# Patient Record
Sex: FEMALE | Race: WHITE | Employment: UNEMPLOYED | URBAN - METROPOLITAN AREA
[De-identification: names, ages, dates, MRNs, and addresses within clinical notes are randomized per-mention and may not be internally consistent; named-entity substitution may affect disease eponyms.]

---

## 2018-06-04 ENCOUNTER — OFFICE VISIT (OUTPATIENT)
Dept: OBGYN CLINIC | Facility: CLINIC | Age: 53
End: 2018-06-04
Payer: COMMERCIAL

## 2018-06-04 ENCOUNTER — APPOINTMENT (OUTPATIENT)
Dept: RADIOLOGY | Facility: CLINIC | Age: 53
End: 2018-06-04
Payer: COMMERCIAL

## 2018-06-04 VITALS
HEART RATE: 125 BPM | BODY MASS INDEX: 23 KG/M2 | DIASTOLIC BLOOD PRESSURE: 81 MMHG | SYSTOLIC BLOOD PRESSURE: 163 MMHG | HEIGHT: 62 IN | WEIGHT: 125 LBS

## 2018-06-04 DIAGNOSIS — M25.532 PAIN IN LEFT WRIST: ICD-10-CM

## 2018-06-04 DIAGNOSIS — S52.592A OTHER CLOSED FRACTURE OF DISTAL END OF LEFT RADIUS, INITIAL ENCOUNTER: Primary | ICD-10-CM

## 2018-06-04 PROCEDURE — 25600 CLTX DST RDL FX/EPHYS SEP WO: CPT | Performed by: ORTHOPAEDIC SURGERY

## 2018-06-04 PROCEDURE — 99203 OFFICE O/P NEW LOW 30 MIN: CPT | Performed by: ORTHOPAEDIC SURGERY

## 2018-06-04 PROCEDURE — 73110 X-RAY EXAM OF WRIST: CPT

## 2018-06-04 NOTE — PROGRESS NOTES
Assessment/Plan:  1  Other closed fracture of distal end of left radius, initial encounter  XR wrist 3+ vw left       Scribe Attestation    I,:   Mila Garza am acting as a scribe while in the presence of the attending physician :        I,:   Miguel Elliott, DO personally performed the services described in this documentation    as scribed in my presence :            Husam Keyes has left sided wrist pain consistent with a nondisplaced distal radius fracture  She was placed in a exos-cast at today's appointment  She was instructed to stay in the cast at all times  She can continue with the OTC anti-inflammatories as needed  We discussed that if all goes as planned she will be in the cast for 4 weeks total  She will follow up in 1 week for repeat x-ray  She can come out of the exos for the x-ray in 1 week  Subjective:   Kaye Light is a 46 y o  female who presents to the office today for left sided wrist pain after falling this morning on an outstretched hand  She states she has a friend who is an orthopedic PA and she placed her in a splint and told her to see an orthopedic  She took Motrin this morning which is helping  She states her pain increases with movement and decreases at rest when in the splint  She denies any radiating pain or numbness at today's appointment  Review of Systems   Constitutional: Negative for chills, fever and unexpected weight change  HENT: Negative for hearing loss, nosebleeds and sore throat  Eyes: Negative for pain, redness and visual disturbance  Respiratory: Negative for cough, shortness of breath and wheezing  Cardiovascular: Negative for chest pain, palpitations and leg swelling  Gastrointestinal: Negative for abdominal pain, nausea and vomiting  Endocrine: Negative for polydipsia and polyuria  Genitourinary: Negative for dysuria and hematuria  Musculoskeletal: Positive for arthralgias and myalgias  See HPI   Skin: Negative for rash and wound  Neurological: Negative for dizziness, numbness and headaches  Psychiatric/Behavioral: Negative for decreased concentration and suicidal ideas  The patient is not nervous/anxious  History reviewed  No pertinent past medical history  History reviewed  No pertinent surgical history  History reviewed  No pertinent family history  Social History     Occupational History    Not on file  Social History Main Topics    Smoking status: Never Smoker    Smokeless tobacco: Never Used    Alcohol use Not on file    Drug use: Unknown    Sexual activity: Not on file       No current outpatient prescriptions on file  Allergies   Allergen Reactions    Penicillins        Objective:  Vitals:    06/04/18 1404   BP: 163/81   Pulse: (!) 125       Right Hand Exam   Right hand exam is normal       Left Hand Exam     Tenderness   The patient is experiencing tenderness in the radial area  Range of Motion     Wrist   Extension:  15 abnormal   Flexion:  20 abnormal     Other   Sensation: normal            Physical Exam   Constitutional: She is oriented to person, place, and time  She appears well-developed and well-nourished  HENT:   Head: Normocephalic and atraumatic  Eyes: Conjunctivae are normal    Neck: Neck supple  Cardiovascular: Intact distal pulses  Pulmonary/Chest: Effort normal    Neurological: She is alert and oriented to person, place, and time  Skin: Skin is warm and dry  Psychiatric: She has a normal mood and affect  Her behavior is normal    Vitals reviewed  I have personally reviewed pertinent films in PACS and my interpretation is as follows: Three view right wrist x-ray taken on 6/4/2018 shows a nondisplaced distal radius fracture

## 2018-06-11 ENCOUNTER — OFFICE VISIT (OUTPATIENT)
Dept: OBGYN CLINIC | Facility: CLINIC | Age: 53
End: 2018-06-11

## 2018-06-11 ENCOUNTER — APPOINTMENT (OUTPATIENT)
Dept: RADIOLOGY | Facility: CLINIC | Age: 53
End: 2018-06-11
Payer: COMMERCIAL

## 2018-06-11 VITALS
SYSTOLIC BLOOD PRESSURE: 156 MMHG | DIASTOLIC BLOOD PRESSURE: 79 MMHG | BODY MASS INDEX: 23.59 KG/M2 | HEART RATE: 111 BPM | HEIGHT: 62 IN | WEIGHT: 128.2 LBS

## 2018-06-11 DIAGNOSIS — S52.592A OTHER CLOSED FRACTURE OF DISTAL END OF LEFT RADIUS, INITIAL ENCOUNTER: ICD-10-CM

## 2018-06-11 DIAGNOSIS — S52.592A OTHER CLOSED FRACTURE OF DISTAL END OF LEFT RADIUS, INITIAL ENCOUNTER: Primary | ICD-10-CM

## 2018-06-11 PROCEDURE — 73100 X-RAY EXAM OF WRIST: CPT

## 2018-06-11 PROCEDURE — 99024 POSTOP FOLLOW-UP VISIT: CPT | Performed by: ORTHOPAEDIC SURGERY

## 2018-06-11 NOTE — PROGRESS NOTES
Assessment/Plan:  1  Other closed fracture of distal end of left radius, initial encounter  CANCELED: XR wrist 3+ vw left       Scribe Attestation    I,:   Mila Garza am acting as a scribe while in the presence of the attending physician :        I,:   Chris Wiseman, DO personally performed the services described in this documentation    as scribed in my presence :          Edin is improving with her nondisplaced distal radius fracture  We discussed that if she would like to exercise she should cross train and not run at this time  She should remain in her exos-cast until further instructed  She will follow up in 3 weeks for repeat evaluation and x-ray  Subjective:   Steve Finnegan is a 46 y o  female who presents to the office for follow up distal radius fracture  She was last seen 1 week ago after suffering a fall on an outstretched hand  She was placed in an exos-cast at her last appointment  At today's appointment she states she is feeling better  She has been wearing the exos-cast as directed  She did try to run last week and felt a cracking and some pain in her wrist so she stopped running  She started to experience some numbness into her thumb which has lessened since she ran  She denies any new injury or trauma at today's appointment  Review of Systems   Constitutional: Negative for chills, fever and unexpected weight change  HENT: Negative for hearing loss, nosebleeds and sore throat  Eyes: Negative for pain, redness and visual disturbance  Respiratory: Negative for cough, shortness of breath and wheezing  Cardiovascular: Negative for chest pain, palpitations and leg swelling  Gastrointestinal: Negative for abdominal pain, nausea and vomiting  Endocrine: Negative for polydipsia and polyuria  Genitourinary: Negative for dysuria and hematuria  Musculoskeletal:        See HPI   Skin: Negative for rash and wound  Neurological: Negative for dizziness, numbness and headaches  Psychiatric/Behavioral: Negative for decreased concentration and suicidal ideas  The patient is not nervous/anxious  Past Medical History:   Diagnosis Date    Fractures        Past Surgical History:   Procedure Laterality Date     SECTION         Family History   Problem Relation Age of Onset    No Known Problems Mother     No Known Problems Father        Social History     Occupational History    Not on file  Social History Main Topics    Smoking status: Never Smoker    Smokeless tobacco: Never Used    Alcohol use Not on file    Drug use: Unknown    Sexual activity: Not on file       No current outpatient prescriptions on file  Allergies   Allergen Reactions    Penicillins        Objective:  Vitals:    18 1303   BP: 156/79   Pulse: (!) 111       Right Hand Exam   Right hand exam is normal       Left Hand Exam     Tenderness   The patient is experiencing tenderness in the radial area  Other   Sensation: normal    Comments:  Bruising over mid-distal inside forearm             Physical Exam   Constitutional: She is oriented to person, place, and time  She appears well-developed and well-nourished  HENT:   Head: Normocephalic and atraumatic  Eyes: Conjunctivae are normal    Neck: Neck supple  Cardiovascular: Intact distal pulses  Pulmonary/Chest: Effort normal    Neurological: She is alert and oriented to person, place, and time  Skin: Skin is warm and dry  Psychiatric: She has a normal mood and affect  Her behavior is normal    Vitals reviewed  I have personally reviewed pertinent films in PACS and my interpretation is as follows: Two view left wrist x-ray taken on 18 shows a stable nondisplaced distal radius fracture

## 2018-07-09 ENCOUNTER — OFFICE VISIT (OUTPATIENT)
Dept: OBGYN CLINIC | Facility: CLINIC | Age: 53
End: 2018-07-09

## 2018-07-09 ENCOUNTER — APPOINTMENT (OUTPATIENT)
Dept: RADIOLOGY | Facility: CLINIC | Age: 53
End: 2018-07-09
Payer: COMMERCIAL

## 2018-07-09 VITALS
BODY MASS INDEX: 23.55 KG/M2 | HEIGHT: 62 IN | WEIGHT: 128 LBS | SYSTOLIC BLOOD PRESSURE: 159 MMHG | DIASTOLIC BLOOD PRESSURE: 80 MMHG | HEART RATE: 100 BPM

## 2018-07-09 DIAGNOSIS — S52.592A OTHER CLOSED FRACTURE OF DISTAL END OF LEFT RADIUS, INITIAL ENCOUNTER: ICD-10-CM

## 2018-07-09 DIAGNOSIS — S52.592D OTHER CLOSED FRACTURE OF DISTAL END OF LEFT RADIUS WITH ROUTINE HEALING, SUBSEQUENT ENCOUNTER: Primary | ICD-10-CM

## 2018-07-09 PROCEDURE — 99024 POSTOP FOLLOW-UP VISIT: CPT | Performed by: ORTHOPAEDIC SURGERY

## 2018-07-09 PROCEDURE — 73100 X-RAY EXAM OF WRIST: CPT

## 2018-07-09 NOTE — PROGRESS NOTES
Assessment/Plan:  1  Other closed fracture of distal end of left radius, initial encounter  XR wrist 2 vw left     Rosio Brennan is doing well following her left wrist fracture  Her x-ray shows stable alignment and healing  Her exam demonstrates no tenderness  She has slightly reduced range of motion due to the healing fracture and immobilization  She can begin gently mobilizing her left wrist  She may stop wearing the cast as long as she remains pain free  I advised her to refrain from upper extremity exercises for additional 2 weeks  It she can progress as tolerated following that time  I will see her back as needed  Subjective:   Darrel Farr is a 46 y o  female who presents for follow-up for a left wrist injury  She had a nondisplaced distal radius fracture 4 weeks ago  She has been in a cast for past 4 weeks  She has a removable cast and states that she has been taking off more in the last week and feels perfectly fine  She still wears a while sleeping  She denies any new injury  Past Medical History:   Diagnosis Date    Fractures        Past Surgical History:   Procedure Laterality Date     SECTION         Family History   Problem Relation Age of Onset    No Known Problems Mother     No Known Problems Father        Social History     Occupational History    Not on file  Social History Main Topics    Smoking status: Never Smoker    Smokeless tobacco: Never Used    Alcohol use Not on file    Drug use: Unknown    Sexual activity: Not on file       No current outpatient prescriptions on file  Allergies   Allergen Reactions    Penicillins        Objective:  Vitals:    18 1058   BP: 159/80   Pulse: 100       Left Hand Exam     Tenderness   The patient is experiencing no tenderness  Range of Motion     Wrist   Extension:  40 abnormal   Flexion: normal   Pronation: normal   Supination: normal     Muscle Strength   The patient has normal left wrist strength      Other Erythema: absent  Sensation: normal  Pulse: present          Strength/Myotome Testing     Left Wrist/Hand   Normal wrist strength      Physical Exam   Constitutional: She is oriented to person, place, and time  She appears well-developed and well-nourished  HENT:   Head: Normocephalic and atraumatic  Eyes: Conjunctivae are normal    Neck: Neck supple  Cardiovascular: Intact distal pulses  Pulmonary/Chest: Effort normal    Neurological: She is alert and oriented to person, place, and time  Skin: Skin is warm and dry  Psychiatric: She has a normal mood and affect  Her behavior is normal    Vitals reviewed  Procedures    I personally reviewed PACS images in the office today and my interpretation is as follows: Three-view x-rays of the left wrist demonstrate a stable healing distal radius fracture with callus formation

## 2019-01-21 ENCOUNTER — TELEPHONE (OUTPATIENT)
Dept: OBGYN CLINIC | Facility: CLINIC | Age: 54
End: 2019-01-21

## 2019-01-24 ENCOUNTER — OFFICE VISIT (OUTPATIENT)
Dept: OBGYN CLINIC | Facility: CLINIC | Age: 54
End: 2019-01-24
Payer: COMMERCIAL

## 2019-01-24 VITALS
BODY MASS INDEX: 22.08 KG/M2 | SYSTOLIC BLOOD PRESSURE: 160 MMHG | HEIGHT: 62 IN | DIASTOLIC BLOOD PRESSURE: 98 MMHG | WEIGHT: 120 LBS

## 2019-01-24 DIAGNOSIS — N63.25 BREAST LUMP ON LEFT SIDE AT 3 O'CLOCK POSITION: ICD-10-CM

## 2019-01-24 DIAGNOSIS — Z12.39 BREAST CANCER SCREENING: Primary | ICD-10-CM

## 2019-01-24 PROCEDURE — 99213 OFFICE O/P EST LOW 20 MIN: CPT | Performed by: NURSE PRACTITIONER

## 2019-01-24 NOTE — PROGRESS NOTES
Assessment/Plan:    Breast cancer screening  Rx for mammogram with instructions on how to schedule  Breast lump on left side at 3 o'clock position  Rx for diagnostic mammogram of left breast to assess for left breast lump  Reviewed how to schedule, advised pt to call today and schedule  Will follow up accordingly       Diagnoses and all orders for this visit:    Breast cancer screening  -     Mammo screening bilateral w cad; Future    Breast lump on left side at 3 o'clock position  -     US breast left limited (diagnostic); Future          Subjective:      Patient ID: Christiane Reese is a 48 y o  female  Pt presents with complaints of "felt lump in left breast "   Pt states one week ago she was playing with son and he accidentally bumper her breast  Afterwards she noticed soreness on her left breast  When in the shower she did self breast exam and noticed a rather large lump in her breast at around 3 o'clock  Pt describes as size of walnut and firm  Lump tender to touch but denies any pain when not palpated  Denies any change in lump over last week  Has been checking constantly but has not noticed a change in lump size or shape  Does do regular SBE and never noticed a lump before  Denies any discharge from nipple  Denies any change in size or shape of breast   Denies any change in color or bruising noted on breast   Denies any family history of breast cancer  Pt has never had a mammogram    Denies any problems with right breast    Pt very nervous and concerned  Last annual exam > 3 years ago  The following portions of the patient's history were reviewed and updated as appropriate: allergies, current medications, past family history, past medical history, past social history, past surgical history and problem list     Review of Systems   Constitutional:        Left breast lump    All other systems reviewed and are negative          Objective:      /98 (BP Location: Left arm, Patient Position: Sitting, Cuff Size: Standard)   Ht 5' 2" (1 575 m)   Wt 54 4 kg (120 lb)   BMI 21 95 kg/m²          Physical Exam   Constitutional: She appears well-developed and well-nourished  Cardiovascular: Normal rate, regular rhythm and normal heart sounds  Pulmonary/Chest: Effort normal and breath sounds normal  Right breast exhibits no inverted nipple, no mass, no nipple discharge, no skin change and no tenderness  Left breast exhibits mass and tenderness (area of lump)  Left breast exhibits no inverted nipple, no nipple discharge and no skin change   Breasts are symmetrical

## 2019-01-24 NOTE — ASSESSMENT & PLAN NOTE
Rx for diagnostic mammogram of left breast to assess for left breast lump  Reviewed how to schedule, advised pt to call today and schedule     Will follow up accordingly

## 2019-01-31 ENCOUNTER — HOSPITAL ENCOUNTER (OUTPATIENT)
Dept: MAMMOGRAPHY | Facility: CLINIC | Age: 54
Discharge: HOME | End: 2019-01-31
Payer: COMMERCIAL

## 2019-01-31 ENCOUNTER — HOSPITAL ENCOUNTER (OUTPATIENT)
Dept: ULTRASOUND IMAGING | Facility: CLINIC | Age: 54
Discharge: HOME | End: 2019-01-31
Payer: COMMERCIAL

## 2019-01-31 VITALS — WEIGHT: 120 LBS | HEIGHT: 62 IN | BODY MASS INDEX: 22.08 KG/M2

## 2019-01-31 DIAGNOSIS — N63.25 BREAST LUMP ON LEFT SIDE AT 3 O'CLOCK POSITION: ICD-10-CM

## 2019-01-31 PROCEDURE — G0279 TOMOSYNTHESIS, MAMMO: HCPCS

## 2019-01-31 PROCEDURE — 76642 ULTRASOUND BREAST LIMITED: CPT

## 2019-01-31 PROCEDURE — 77066 DX MAMMO INCL CAD BI: CPT

## 2019-01-31 NOTE — PROGRESS NOTES
Met with patient and Dr Henna Barron regarding recommendation for;      _____ RIGHT ___x___LEFT      __x___Ultrasound guided  ______Stereotactic  Breast biopsy  __x___Verbalized understanding        Blood thinners:  _____yes __x___no    Date stopped: ____n/a_______    Biopsy teaching sheet given:  ___x____yes ______no

## 2019-02-07 ENCOUNTER — HOSPITAL ENCOUNTER (OUTPATIENT)
Dept: ULTRASOUND IMAGING | Facility: CLINIC | Age: 54
Discharge: HOME | End: 2019-02-07
Payer: COMMERCIAL

## 2019-02-07 VITALS — DIASTOLIC BLOOD PRESSURE: 80 MMHG | HEART RATE: 128 BPM | SYSTOLIC BLOOD PRESSURE: 160 MMHG

## 2019-02-07 DIAGNOSIS — R92.8 ABNORMAL MAMMOGRAM: ICD-10-CM

## 2019-02-07 PROCEDURE — 76942 ECHO GUIDE FOR BIOPSY: CPT

## 2019-02-07 PROCEDURE — 10160 PNXR ASPIR ABSC HMTMA BULLA: CPT

## 2019-02-07 RX ORDER — LIDOCAINE HYDROCHLORIDE 10 MG/ML
4 INJECTION, SOLUTION INFILTRATION; PERINEURAL ONCE
Status: COMPLETED | OUTPATIENT
Start: 2019-02-07 | End: 2019-02-07

## 2019-02-07 RX ADMIN — LIDOCAINE HYDROCHLORIDE 4 ML: 10 INJECTION, SOLUTION INFILTRATION; PERINEURAL at 14:17

## 2019-02-07 NOTE — PROGRESS NOTES
Ice pack given:    __X___yes _____no    Discharge instructions signed by patient:    ___X__yes _____no    Discharge instructions given to patient:    __X___yes _____no    Discharged via:    ___X__amulatory    _____wheelchair    _____stretcher    Stable on discharge:    ___XPOST LARGE CORE BREAST BIOPSY PATIENT INFORMATION      1  Place an ice pack inside your bra over the top of the dressing every hour for 20 minutes (20 minutes on, 60 minutes off)  Do this until bedtime  2  Do not shower or bathe until the following morning  3  You may bathe your breast carefully with the steri-strips in place  Be careful    Not to loosen them  The steri-strips will fall off in 3-5 days  4  You may have mild discomfort, and you may have some bruising where the   Needle entered the skin  This should clear within 5-7 days  5  If you need medicine for discomfort, take acetaminophen products such as   Tylenol  You may also take Advil or Motrin products  6  Do not participate in strenuous activities such as-tennis, aerobics, skiing,  Weight lifting, etc  for 24 hours  Refrain from swimming/soaking for 72 hours  7  Wearing a bra for sleeping may be more comfortable for the first 24-48 hours  8  Watch for continued bleeding, pain or fever over 101; please call with any questions or concerns  For procedures done at the [facility] call:  Jaron Noble RN at [phone]  Sandra Hernandez RN at [phone]                    *After 4 PM call the Interventional Radiology Department                    [phone] and ask to speak with the nurse on call  For procedures done at the [address] [place] [address] call:         Oly Dye RN at   *After 4 PM call the Interventional Radiology Department   [phone] and ask to speak with the nurse on call  For procedures done at [address] [place] [address] call:   The Radiology Nurse at [phone]  *After 4 PM call your physician, or go to the Emergency Department  9          The final results of your biopsy are usually available within one week  __yes ____no

## 2019-02-07 NOTE — PROGRESS NOTES
Procedure type:    ___x__ultrasound guided cyst aspiration     _____stereotactic    Breast:    __x___Left _____Right    Location: 300 2cmfn    Needle: 12ga Holli    # of passes: 1    Clip: N/A    Performed by: Dr David Cook held for 5 minutes by:  Brian Fnuk(PCA)    Steri Strips:    ____yes _X___no    Band aid:    __X___yes_____no    Tape and guaze:    _____yes ___X__no    Tolerated procedure:    __X___yes _____no

## 2019-02-07 NOTE — DISCHARGE INSTR - OTHER ORDERS
POST LARGE CORE BREAST BIOPSY PATIENT INFORMATION      1  Place an ice pack inside your bra over the top of the dressing every hour for 20 minutes (20 minutes on, 60 minutes off)  Do this until bedtime  2  Do not shower or bathe until the following morning  3  You may bathe your breast carefully with the steri-strips in place  Be careful    Not to loosen them  The steri-strips will fall off in 3-5 days  4  You may have mild discomfort, and you may have some bruising where the   Needle entered the skin  This should clear within 5-7 days  5  If you need medicine for discomfort, take acetaminophen products such as   Tylenol  You may also take Advil or Motrin products  6  Do not participate in strenuous activities such as-tennis, aerobics, skiing,  Weight lifting, etc  for 24 hours  Refrain from swimming/soaking for 72 hours  7  Wearing a bra for sleeping may be more comfortable for the first 24-48 hours  8  Watch for continued bleeding, pain or fever over 101; please call with any questions or concerns  For procedures done at the [facility]  [place] [facility] [address] call:  Marcin Smith RN at [phone]  Merrill Schaefer RN at [phone]                    *After 4 PM call the Interventional Radiology Department                    [phone] and ask to speak with the nurse on call  For procedures done at the [address] [place] [address] call:         Tana Hou RN at   *After 4 PM call the Interventional Radiology Department   [phone] and ask to speak with the nurse on call  For procedures done at [address] [place] [address] call: The Radiology Nurse at [phone]  *After 4 PM call your physician, or go to the Emergency Department  9          The final results of your biopsy are usually available within one week

## 2019-02-08 NOTE — PROGRESS NOTES
Post procedure call completed on 2/8/19 @ 60 920 06 98        Bleeding: _____yes __x___no    Pain: _____yes ___x___no    Redness/Swelling: ______yes ___x___no    Band aid removed: ___x__yes _____no    Steri-Strips intact: ______yes _____no ___x__N/A

## 2021-01-29 ENCOUNTER — NURSE TRIAGE (OUTPATIENT)
Dept: OTHER | Facility: OTHER | Age: 56
End: 2021-01-29

## 2021-01-29 DIAGNOSIS — Z20.828 SARS-ASSOCIATED CORONAVIRUS EXPOSURE: Primary | ICD-10-CM

## 2021-01-29 DIAGNOSIS — Z20.828 SARS-ASSOCIATED CORONAVIRUS EXPOSURE: ICD-10-CM

## 2021-01-29 PROCEDURE — U0003 INFECTIOUS AGENT DETECTION BY NUCLEIC ACID (DNA OR RNA); SEVERE ACUTE RESPIRATORY SYNDROME CORONAVIRUS 2 (SARS-COV-2) (CORONAVIRUS DISEASE [COVID-19]), AMPLIFIED PROBE TECHNIQUE, MAKING USE OF HIGH THROUGHPUT TECHNOLOGIES AS DESCRIBED BY CMS-2020-01-R: HCPCS | Performed by: FAMILY MEDICINE

## 2021-01-29 PROCEDURE — U0005 INFEC AGEN DETEC AMPLI PROBE: HCPCS | Performed by: FAMILY MEDICINE

## 2021-01-29 NOTE — TELEPHONE ENCOUNTER
Reason for Disposition   [1] COVID-19 infection suspected by caller or triager AND [2] mild symptoms (cough, fever, or others) AND [0] no complications or SOB    Answer Assessment - Initial Assessment Questions  1  COVID-19 DIAGNOSIS: "Who made your Coronavirus (COVID-19) diagnosis?" "Was it confirmed by a positive lab test?" If not diagnosed by a HCP, ask "Are there lots of cases (community spread) where you live?" (See public health department website, if unsure)      Not tested for Covid and unsure of community spread in area  2  COVID-19 EXPOSURE: "Was there any known exposure to COVID before the symptoms began?" CDC Definition of close contact: within 6 feet (2 meters) for a total of 15 minutes or more over a 24-hour period  Family member tested Positive for Covid-19 last week- does think she had direct exposure  3  ONSET: "When did the COVID-19 symptoms start?"       Started Wed- 1/27/21 congestion started- diarrhea 2x- once earlier today and none since  4  WORST SYMPTOM: "What is your worst symptom?" (e g , cough, fever, shortness of breath, muscle aches)      Congestion-  5  COUGH: "Do you have a cough?" If so, ask: "How bad is the cough?"        Denies any cough  6  FEVER: "Do you have a fever?" If so, ask: "What is your temperature, how was it measured, and when did it start?"      Denies any fever or chills- temp last taken this morning under arm- result [id number] 4  7  RESPIRATORY STATUS: "Describe your breathing?" (e g , shortness of breath, wheezing, unable to speak)       NO SOB , wheezing and is not having difficulty speaking with nurse  8  BETTER-SAME-WORSE: "Are you getting better, staying the same or getting worse compared to yesterday?"  If getting worse, ask, "In what way?"      "a little better"  9  HIGH RISK DISEASE: "Do you have any chronic medical problems?" (e g , asthma, heart or lung disease, weak immune system, etc )      Denies any chronic medical issues  10   PREGNANCY: "Is there any chance you are pregnant?" "When was your last menstrual period?"        NO- Postmenopausel  11   OTHER SYMPTOMS: "Do you have any other symptoms?"  (e g , chills, fatigue, headache, loss of smell or taste, muscle pain, sore throat)        Denies all of the above    Protocols used: CORONAVIRUS (COVID-19) DIAGNOSED OR SUSPECTED-ADULT-[place]

## 2021-01-29 NOTE — TELEPHONE ENCOUNTER
Regarding: SYMPTOMATIC - CONGESTION/DIARRHEA - COVID TEST REQUEST  ----- Message from Meghann Unger sent at 1/29/2021 12:31 PM EST -----  "I need to be tested due to symptoms of congestion and diarrhea after exposure to someone who tested positive "

## 2021-01-30 ENCOUNTER — TELEPHONE (OUTPATIENT)
Dept: OTHER | Facility: OTHER | Age: 56
End: 2021-01-30

## 2021-01-30 LAB — SARS-COV-2 RNA RESP QL NAA+PROBE: POSITIVE

## 2021-01-30 NOTE — TELEPHONE ENCOUNTER
Your test for the novel coronavirus, also known as COVID-19, was positive  The sample showed that the virus was present  Positive COVID-19 test results are reportable to the [place] Department of Health  You may receive a call from trained public health staff to conduct an interview  It is important to answer their call  They will ask you to verify who you are  During the call they will ask you about what symptoms you have, what you did before you got sick, and who you were close to while sick  The health department does this to make sure everyone stays healthy and to reduce the spread of the virus  If you would like to verify if the caller does in fact work in contact tracing, call the [address] [place] [address] at [employer] ([phone])  For additional information, please visit the [employer] website: www [email or web address] [place] gov     If you have any additional questions, we can schedule a virtual visit for you with a provider or call the [facility] [phone], option 7, for care advice    For additional information, please visit the Coronavirus FAQ on the [facility] home page ([email or web address]  [email or web address])  Pt does not have a PCP for follow up  Pt offered a follow up appointment with a Kevin Carroll physician  Pt declined offer at this time